# Patient Record
Sex: FEMALE | Race: WHITE | Employment: OTHER | RURAL
[De-identification: names, ages, dates, MRNs, and addresses within clinical notes are randomized per-mention and may not be internally consistent; named-entity substitution may affect disease eponyms.]

---

## 2024-11-09 ENCOUNTER — APPOINTMENT (OUTPATIENT)
Facility: HOSPITAL | Age: 79
End: 2024-11-09
Payer: MEDICARE

## 2024-11-09 ENCOUNTER — HOSPITAL ENCOUNTER (EMERGENCY)
Facility: HOSPITAL | Age: 79
Discharge: HOME | End: 2024-11-09
Attending: EMERGENCY MEDICINE
Payer: MEDICARE

## 2024-11-09 VITALS
SYSTOLIC BLOOD PRESSURE: 152 MMHG | TEMPERATURE: 98.2 F | OXYGEN SATURATION: 97 % | DIASTOLIC BLOOD PRESSURE: 83 MMHG | HEART RATE: 66 BPM | RESPIRATION RATE: 16 BRPM

## 2024-11-09 DIAGNOSIS — M25.562 ACUTE PAIN OF LEFT KNEE: Primary | ICD-10-CM

## 2024-11-09 PROCEDURE — 73562 X-RAY EXAM OF KNEE 3: CPT

## 2024-11-09 PROCEDURE — 99283 EMERGENCY DEPT VISIT LOW MDM: CPT

## 2024-11-09 PROCEDURE — 6370000000 HC RX 637 (ALT 250 FOR IP): Performed by: EMERGENCY MEDICINE

## 2024-11-09 RX ORDER — OXYCODONE HYDROCHLORIDE 5 MG/1
5 TABLET ORAL
Status: COMPLETED | OUTPATIENT
Start: 2024-11-09 | End: 2024-11-09

## 2024-11-09 RX ORDER — PREDNISONE 20 MG/1
40 TABLET ORAL ONCE
Status: COMPLETED | OUTPATIENT
Start: 2024-11-09 | End: 2024-11-09

## 2024-11-09 RX ORDER — PREDNISONE 50 MG/1
50 TABLET ORAL DAILY
Qty: 5 TABLET | Refills: 0 | Status: SHIPPED | OUTPATIENT
Start: 2024-11-09 | End: 2024-11-14

## 2024-11-09 RX ORDER — OXYCODONE HYDROCHLORIDE 5 MG/1
5 TABLET ORAL EVERY 6 HOURS PRN
Qty: 12 TABLET | Refills: 0 | Status: SHIPPED | OUTPATIENT
Start: 2024-11-09 | End: 2024-11-12

## 2024-11-09 RX ADMIN — OXYCODONE 5 MG: 5 TABLET ORAL at 14:17

## 2024-11-09 RX ADMIN — PREDNISONE 40 MG: 20 TABLET ORAL at 15:12

## 2024-11-09 ASSESSMENT — LIFESTYLE VARIABLES
HOW MANY STANDARD DRINKS CONTAINING ALCOHOL DO YOU HAVE ON A TYPICAL DAY: PATIENT DOES NOT DRINK
HOW OFTEN DO YOU HAVE A DRINK CONTAINING ALCOHOL: NEVER

## 2024-11-09 ASSESSMENT — PAIN SCALES - GENERAL: PAINLEVEL_OUTOF10: 4

## 2024-11-09 NOTE — ED TRIAGE NOTES
Left posterior knee pain x 1 week with hx of bakers cysts. Pain radiates down leg , pain worse with standing

## 2024-11-09 NOTE — ED NOTES
Patient able to ambulate, however she is complaining of severe pain when putting weight on her left leg

## 2024-11-09 NOTE — DISCHARGE INSTRUCTIONS
For pain control at home please follow this plan:  For baseline pain control please start by taking acetaminophen (Tylenol) 1000 mg, or to 500 mg pills, every 6-8 hours.  I will also have you on a dose of steroid which she will take once a day for 5 days  If these things are not relieving your pain and your pain is severe I have written a prescription for oxycodone that you can use.  Oxycodone should be taken 1 tablet every 4-6 hours as needed for severe pain.  This medication can constipate you and make you feel intoxicated.  Only use it when pain is severe

## 2024-11-09 NOTE — ED PROVIDER NOTES
[facility] EMERGENCY DEP  EMERGENCY DEPARTMENT ENCOUNTER       Pt Name: Marisela Ruffin  MRN: [id number]  Birthdate 1945  Date of evaluation: 11/9/2024  Provider: Duncan Alberts DO   PCP: No primary care provider on file.  Note Started: 2:05 PM EST 11/9/24     CHIEF COMPLAINT       Chief Complaint   Patient presents with    Knee Pain        HISTORY OF PRESENT ILLNESS: 1 or more elements      History From: Patient, History limited by: none     Marisela Ruffin is a 78 y.o. female presenting the emergency department with left knee pain.  She has been having some discomfort in the left knee over the last several days, had similar previous episode and was diagnosed with a Baker's cyst and given a steroid injection.  Pain became more severe today and she was unable to bear weight.  No pain down the leg.  No distal numbness or tingling.  No falls injury or trauma.       Please See MDM for Additional Details of the HPI/PMH  Nursing Notes were all reviewed and agreed with or any disagreements were addressed in the HPI.     REVIEW OF SYSTEMS        Positives and Pertinent negatives as per HPI.    PAST HISTORY     Past Medical History:  History reviewed. No pertinent past medical history.    Past Surgical History:  History reviewed. No pertinent surgical history.    Family History:  History reviewed. No pertinent family history.    Social History:       Allergies:  No Known Allergies    CURRENT MEDICATIONS      Discharge Medication List as of 11/9/2024  3:13 PM          SCREENINGS               No data recorded         PHYSICAL EXAM      ED Triage Vitals [11/09/24 1336]   Encounter Vitals Group      BP (!) 152/83      Systolic BP Percentile       Diastolic BP Percentile       Pulse 66      Respirations 16      Temp 98.2 °F (36.8 °C)      Temp Source Oral      SpO2 97 %      Weight       Height       Head Circumference       Peak Flow       Pain Score       Pain Loc       Pain Education       Exclude from Growth Chart      [place] [id number]  [phone]    If symptoms worsen       DISCHARGE MEDICATIONS:     Medication List        START taking these medications      oxyCODONE 5 MG immediate release tablet  Commonly known as: Roxicodone  Take 1 tablet by mouth every 6 hours as needed for Pain for up to 3 days. Intended supply: 3 days. Take lowest dose possible to manage pain Max Daily Amount: 20 mg     predniSONE 50 MG tablet  Commonly known as: DELTASONE  Take 1 tablet by mouth daily for 5 days               Where to Get Your Medications        These medications were sent to [id number] - [place], [place] - [address] - P [phone] - F [phone]  [address] [address]      Phone: [phone]   oxyCODONE 5 MG immediate release tablet  predniSONE 50 MG tablet           DISCONTINUED MEDICATIONS:  Discharge Medication List as of 11/9/2024  3:13 PM          I am the Primary Clinician of Record.   Duncan Alberts DO (electronically signed)    (Please note that parts of this dictation were completed with voice recognition software. Quite often unanticipated grammatical, syntax, homophones, and other interpretive errors are inadvertently transcribed by the computer software. Please disregards these errors. Please excuse any errors that have escaped final proofreading.)         Duncan Alberts DO  11/09/24 1971